# Patient Record
Sex: MALE | Race: WHITE
[De-identification: names, ages, dates, MRNs, and addresses within clinical notes are randomized per-mention and may not be internally consistent; named-entity substitution may affect disease eponyms.]

---

## 2018-02-15 ENCOUNTER — HOSPITAL ENCOUNTER (EMERGENCY)
Dept: HOSPITAL 68 - ERH | Age: 48
End: 2018-02-15
Payer: COMMERCIAL

## 2018-02-15 VITALS — WEIGHT: 175 LBS | BODY MASS INDEX: 25.05 KG/M2 | HEIGHT: 70 IN

## 2018-02-15 VITALS — DIASTOLIC BLOOD PRESSURE: 89 MMHG | SYSTOLIC BLOOD PRESSURE: 146 MMHG

## 2018-02-15 DIAGNOSIS — Z48.02: Primary | ICD-10-CM

## 2018-02-15 NOTE — ED ANIMAL BITE/WOUND CHECK
History of Present Illness
 
General
Chief Complaint: Suture Removal/Wound Recheck
Stated Complaint: TO GET STAPLES REMOVED FROM HEAD
Source: patient, old records
Exam Limitations: no limitations
 
Vital Signs & Intake/Output
Vital Signs & Intake/Output
 Vital Signs
 
 
Date Time Temp Pulse Resp B/P B/P Pulse O2 O2 Flow FiO2
 
     Mean Ox Delivery Rate 
 
02/15 1711 98.6 96 18 146/89  98 Room Air  
 
 
 
Allergies
Coded Allergies:
No Known Allergies (02/07/18)
 
Triage Note:
46 YO MALE TO TRIAGE FOR STAPLE REMOVAL FROM HEAD.
Triage Nurses Notes Reviewed? yes
Onset: Abrupt
Duration: week(s): (1)
Timing: single episode today
Injury Environment: home
Is Injury an Animal Bite? No
Severity Numbers: 1
No Modifying Factors: none
HPI:
47-year-old male medical history presents for staple removal.  Patient was seen 
with a scalp laceration about one week ago.  A single staple was used to 
approximate the wound.  Patient feels like everything has been going well.  No 
discharge redness pain or fever.  He's been keeping the area clean and dry.
(Arley Honeycutt)
 
Past History
 
Travel History
Traveled to [place] past 21 day No
 
Medical History
Any Pertinent Medical History? see below for history
Gastrointestinal: ulcerative colitis
Tetanus Vaccine: 09/20/13
 
Surgical History
Surgical History: non-contributory
 
Psychosocial History
What is your primary language English
Tobacco Use: Never used
 
Family History
Hx Contributory? No
(Arley Honeycutt)
 
Review of Systems
 
Review of Systems
Constitutional:
Reports: no symptoms. 
EENTM:
Reports: no symptoms. 
Respiratory:
Reports: no symptoms. 
Cardiovascular:
Reports: no symptoms. 
GI:
Reports: no symptoms. 
Genitourinary:
Reports: no symptoms. 
Musculoskeletal:
Reports: no symptoms. 
Skin:
Reports: see HPI (scalp laceration). 
Neurological/Psychological:
Reports: no symptoms. 
Hematologic/Endocrine:
Reports: no symptoms. 
Immunologic/Allergic:
Reports: no symptoms. 
All Other Systems: Reviewed and Negative
(Arley Honeycutt)
 
Physical Exam
 
Physical Exam
General Appearance: well developed/nourished, no apparent distress, alert, awake
Head: well-healed scalp laceration over the occipital scalp.  Single staple is 
in place.  No erythema discharge pain or swelling.
Eyes:
Bilateral: normal appearance, EOMI. 
Ears, Nose, Throat: hearing grossly normal
Neck: normal inspection, supple, full range of motion
Respiratory: no respiratory distress
Extremities: normal range of motion
Neurologic/Psych: no motor/sensory deficits, awake, alert, oriented x 3, normal 
gait
Skin: intact, normal color, warm/dry
(Arley Honeycutt)
 
Progress
Differential Diagnosis: abscess, cellulitis
Plan of Care:
Laceration is well approximated.  A single staple was removed from the 
laceration.  Patient tolerated well.  The wound remains clean dry and intact.  
Reviewed return precautions and wound care procedures.  Patient appears 
clinically well agrees the plan.
(Arley Honeycutt)
 
Departure
 
Departure
Disposition: HOME OR SELF CARE
Condition: Stable
Clinical Impression
Primary Impression: Removal of staple
Referrals:
Riccardo VO,Bandar GARCIA (PCP/Family)
 
Additional Instructions:
KEEP The area clean and dry. LOOK OUT FOR SIGNS of infection like redness 
swelling discharge pain.  Monitor symptoms and return with any concerns.
Departure Forms:
Customer Survey
General Discharge Information
(Arley Honeycutt)
 
PA/NP Co-Sign Statement
Statement:
ED Attending supervision documentation-
 
[] I saw and evaluated the patient. I have also reviewed all the pertinent lab 
results and diagnostic results. I agree with the findings and the plan of care 
as documented in the PA's/NP's documentation. 
 
[x] I have reviewed the ED Record and agree with the PA's/NP's documentation.
 
[] Additions or exceptions (if any) to the PAs/NP's note and plan are 
summarized below:
[]
 
(Anibal Garcia DO